# Patient Record
Sex: MALE | Race: WHITE | NOT HISPANIC OR LATINO | Employment: STUDENT | URBAN - METROPOLITAN AREA
[De-identification: names, ages, dates, MRNs, and addresses within clinical notes are randomized per-mention and may not be internally consistent; named-entity substitution may affect disease eponyms.]

---
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
## 2024-07-22 ENCOUNTER — OFFICE VISIT (OUTPATIENT)
Dept: FAMILY MEDICINE CLINIC | Facility: CLINIC | Age: 19
End: 2024-07-22
Payer: COMMERCIAL

## 2024-07-22 VITALS
SYSTOLIC BLOOD PRESSURE: 126 MMHG | HEIGHT: 69 IN | WEIGHT: 167.8 LBS | BODY MASS INDEX: 24.85 KG/M2 | HEART RATE: 80 BPM | RESPIRATION RATE: 16 BRPM | TEMPERATURE: 97.2 F | DIASTOLIC BLOOD PRESSURE: 80 MMHG | OXYGEN SATURATION: 98 %

## 2024-07-22 DIAGNOSIS — Z23 ENCOUNTER FOR IMMUNIZATION: ICD-10-CM

## 2024-07-22 DIAGNOSIS — Z00.00 ANNUAL PHYSICAL EXAM: Primary | ICD-10-CM

## 2024-07-22 PROCEDURE — 99395 PREV VISIT EST AGE 18-39: CPT | Performed by: FAMILY MEDICINE

## 2024-07-22 PROCEDURE — 90621 MENB-FHBP VACC 2/3 DOSE IM: CPT

## 2024-07-22 PROCEDURE — 90471 IMMUNIZATION ADMIN: CPT

## 2024-07-22 NOTE — PATIENT INSTRUCTIONS
"Patient Education     Routine physical for adults   The Basics   Written by the doctors and editors at [facility]   What is a physical? -- A physical is a routine visit, or \"check-up,\" with your doctor. You might also hear it called a \"wellness visit\" or \"preventive visit.\"  During each visit, the doctor will:   Ask about your physical and mental health   Ask about your habits, behaviors, and lifestyle   Do an exam   Give you vaccines if needed   Talk to you about any medicines you take   Give advice about your health   Answer your questions  Getting regular check-ups is an important part of taking care of your health. It can help your doctor find and treat any problems you have. But it's also important for preventing health problems.  A routine physical is different from a \"sick visit.\" A sick visit is when you see a doctor because of a health concern or problem. Since physicals are scheduled ahead of time, you can think about what you want to ask the doctor.  How often should I get a physical? -- It depends on your age and health. In general, for people age 21 years and older:   If you are younger than 50 years, you might be able to get a physical every 3 years.   If you are 50 years or older, your doctor might recommend a physical every year.  If you have an ongoing health condition, like diabetes or high blood pressure, your doctor will probably want to see you more often.  What happens during a physical? -- In general, each visit will include:   Physical exam - The doctor or nurse will check your height, weight, heart rate, and blood pressure. They will also look at your eyes and ears. They will ask about how you are feeling and whether you have any symptoms that bother you.   Medicines - It's a good idea to bring a list of all the medicines you take to each doctor visit. Your doctor will talk to you about your medicines and answer any questions. Tell them if you are having any side effects that bother you. You " "should also tell them if you are having trouble paying for any of your medicines.   Habits and behaviors - This includes:   Your diet   Your exercise habits   Whether you smoke, drink alcohol, or use drugs   Whether you are sexually active   Whether you feel safe at home  Your doctor will talk to you about things you can do to improve your health and lower your risk of health problems. They will also offer help and support. For example, if you want to quit smoking, they can give you advice and might prescribe medicines. If you want to improve your diet or get more physical activity, they can help you with this, too.   Lab tests, if needed - The tests you get will depend on your age and situation. For example, your doctor might want to check your:   Cholesterol   Blood sugar   Iron level   Vaccines - The recommended vaccines will depend on your age, health, and what vaccines you already had. Vaccines are very important because they can prevent certain serious or deadly infections.   Discussion of screening - \"Screening\" means checking for diseases or other health problems before they cause symptoms. Your doctor can recommend screening based on your age, risk, and preferences. This might include tests to check for:   Cancer, such as breast, prostate, cervical, ovarian, colorectal, prostate, lung, or skin cancer   Sexually transmitted infections, such as chlamydia and gonorrhea   Mental health conditions like depression and anxiety  Your doctor will talk to you about the different types of screening tests. They can help you decide which screenings to have. They can also explain what the results might mean.   Answering questions - The physical is a good time to ask the doctor or nurse questions about your health. If needed, they can refer you to other doctors or specialists, too.  Adults older than 65 years often need other care, too. As you get older, your doctor will talk to you about:   How to prevent falling at " home   Hearing or vision tests   Memory testing   How to take your medicines safely   Making sure that you have the help and support you need at home  All topics are updated as new evidence becomes available and our peer review process is complete.  This topic retrieved from [employer] on: May 02, 2024.  Topic 108130 Version 1.0  Release: 32.4.3 - C32.122  © 2024 UpToDate, Inc. and/or its affiliates. All rights reserved.  Consumer Information Use and Disclaimer   Disclaimer: This generalized information is a limited summary of diagnosis, treatment, and/or medication information. It is not meant to be comprehensive and should be used as a tool to help the user understand and/or assess potential diagnostic and treatment options. It does NOT include all information about conditions, treatments, medications, side effects, or risks that may apply to a specific patient. It is not intended to be medical advice or a substitute for the medical advice, diagnosis, or treatment of a health care provider based on the health care provider's examination and assessment of a patient's specific and unique circumstances. Patients must speak with a health care provider for complete information about their health, medical questions, and treatment options, including any risks or benefits regarding use of medications. This information does not endorse any treatments or medications as safe, effective, or approved for treating a specific patient. UpToDate, Inc. and its affiliates disclaim any warranty or liability relating to this information or the use thereof.The use of this information is governed by the Terms of Use, available at https://www.[employer] [employer].com/en/know/clinical-effectiveness-terms. 2024© UpToDate, Inc. and its affiliates and/or licensors. All rights reserved.  Copyright   © 2024 UpToDate, Inc. and/or its affiliates. All rights reserved.

## 2024-07-22 NOTE — PROGRESS NOTES
"Adult Annual Physical  Name: Dav Glasgow      : 2005      MRN: [id number]  Encounter Provider: Wanda Panda DO  Encounter Date: 2024   Encounter department: [facility]    Assessment & Plan   1. Annual physical exam  2. Encounter for immunization  -     MENINGOCOCCAL B RECOMBINANT    Immunizations and preventive care screenings were discussed with patient today. Appropriate education was printed on patient's after visit summary.    Counseling:  Dental Health: discussed importance of regular tooth brushing, flossing, and dental visits.      Depression Screening and Follow-up Plan: Patient was screened for depression during today's encounter. They screened negative with a PHQ-2 score of 2.        History of Present Illness     Adult Annual Physical:  Patient presents for annual physical. Here for college physcial  Transferring into [facility].     Diet and Physical Activity:  - Diet/Nutrition: well balanced diet.  - Exercise: moderate cardiovascular exercise. tennis basketball    Depression Screening:  - PHQ-2 Score: 2    General Health:  - Sleep: sleeps well.  - Hearing: normal hearing right ear and normal hearing left ear.  - Vision: no vision problems.  - Dental: regular dental visits.     Health:    - Urinary symptoms: none.     Review of Systems   Constitutional: Negative.    HENT: Negative.     Eyes: Negative.    Respiratory: Negative.     Cardiovascular: Negative.    Gastrointestinal: Negative.    Endocrine: Negative.    Genitourinary: Negative.    Musculoskeletal: Negative.    Skin: Negative.    Allergic/Immunologic: Negative.    Neurological: Negative.    Hematological: Negative.    Psychiatric/Behavioral: Negative.       Medical History Reviewed by provider this encounter:         Objective     /80 (BP Location: Left arm, Patient Position: Sitting, Cuff Size: Standard)   Pulse 80   Temp (!) 97.2 °F (36.2 °C) (Tympanic)   Resp 16   Ht 5' 8.62\" (1.743 m)   Wt 76.1 " kg (167 lb 12.8 oz)   SpO2 98%   BMI 25.05 kg/m²     Physical Exam  Vitals and nursing note reviewed.   Constitutional:       Appearance: Normal appearance. He is well-developed.   HENT:      Head: Normocephalic and atraumatic.      Right Ear: External ear normal.      Left Ear: External ear normal.      Nose: Nose normal.   Eyes:      Extraocular Movements: Extraocular movements intact.      Conjunctiva/sclera: Conjunctivae normal.      Pupils: Pupils are equal, round, and reactive to light.   Cardiovascular:      Rate and Rhythm: Normal rate and regular rhythm.      Heart sounds: Normal heart sounds.   Pulmonary:      Effort: Pulmonary effort is normal.      Breath sounds: Normal breath sounds.   Abdominal:      General: Bowel sounds are normal.      Palpations: Abdomen is soft.   Musculoskeletal:         General: Normal range of motion.      Cervical back: Normal range of motion and neck supple.   Skin:     General: Skin is warm and dry.      Capillary Refill: Capillary refill takes less than 2 seconds.   Neurological:      General: No focal deficit present.      Mental Status: He is alert and oriented to person, place, and time.   Psychiatric:         Mood and Affect: Mood normal.         Behavior: Behavior normal.         Thought Content: Thought content normal.         Judgment: Judgment normal.

## 2024-07-29 ENCOUNTER — CLINICAL SUPPORT (OUTPATIENT)
Dept: FAMILY MEDICINE CLINIC | Facility: CLINIC | Age: 19
End: 2024-07-29
Payer: COMMERCIAL

## 2024-07-29 DIAGNOSIS — Z11.1 SCREENING FOR TUBERCULOSIS: Primary | ICD-10-CM

## 2024-07-29 PROCEDURE — 86580 TB INTRADERMAL TEST: CPT

## 2024-07-29 NOTE — PROGRESS NOTES
Assessment/Plan:      Diagnoses and all orders for this visit:    Screening for tuberculosis  -     TB Skin Test          Subjective:     Patient ID: Dav Glasgow is a 19 y.o. male.          Objective:      There were no vitals taken for this visit.

## 2024-07-31 ENCOUNTER — CLINICAL SUPPORT (OUTPATIENT)
Dept: FAMILY MEDICINE CLINIC | Facility: CLINIC | Age: 19
End: 2024-07-31

## 2024-07-31 DIAGNOSIS — Z11.1 ENCOUNTER FOR PPD SKIN TEST READING: Primary | ICD-10-CM

## 2024-07-31 LAB
INDURATION: 0 MM
TB SKIN TEST: NEGATIVE

## 2024-07-31 NOTE — PROGRESS NOTES
Assessment/Plan:      Diagnoses and all orders for this visit:    Encounter for PPD skin test reading          Subjective:     Patient ID: Dav Glasgow is a 19 y.o. male.          Objective:    Left forearm Negative PPD reading.  There were no vitals taken for this visit.

## 2025-01-24 ENCOUNTER — CLINICAL SUPPORT (OUTPATIENT)
Dept: FAMILY MEDICINE CLINIC | Facility: CLINIC | Age: 20
End: 2025-01-24
Payer: COMMERCIAL

## 2025-01-24 DIAGNOSIS — Z23 ENCOUNTER FOR IMMUNIZATION: Primary | ICD-10-CM

## 2025-01-24 PROCEDURE — 90471 IMMUNIZATION ADMIN: CPT

## 2025-01-24 PROCEDURE — 90621 MENB-FHBP VACC 2/3 DOSE IM: CPT

## 2025-01-24 NOTE — PROGRESS NOTES
Assessment/Plan:      Diagnoses and all orders for this visit:    Encounter for immunization          Subjective:     Patient ID: Dav Glasgow is a 19 y.o. male.          Objective:      There were no vitals taken for this visit.

## 2025-03-17 ENCOUNTER — OFFICE VISIT (OUTPATIENT)
Dept: DERMATOLOGY | Facility: CLINIC | Age: 20
End: 2025-03-17
Payer: COMMERCIAL

## 2025-03-17 ENCOUNTER — TELEPHONE (OUTPATIENT)
Dept: DERMATOLOGY | Facility: CLINIC | Age: 20
End: 2025-03-17

## 2025-03-17 DIAGNOSIS — L20.9 ATOPIC DERMATITIS, UNSPECIFIED TYPE: Primary | ICD-10-CM

## 2025-03-17 PROCEDURE — 99214 OFFICE O/P EST MOD 30 MIN: CPT

## 2025-03-17 RX ORDER — TACROLIMUS 1 MG/G
OINTMENT TOPICAL
Qty: 100 G | Refills: 3 | Status: SHIPPED | OUTPATIENT
Start: 2025-03-17

## 2025-03-17 RX ORDER — DUPILUMAB 300 MG/2ML
INJECTION, SOLUTION SUBCUTANEOUS
Qty: 4 ML | Refills: 0 | Status: SHIPPED | OUTPATIENT
Start: 2025-03-17

## 2025-03-17 RX ORDER — CLOBETASOL PROPIONATE 0.5 MG/G
OINTMENT TOPICAL
Qty: 60 G | Refills: 2 | Status: SHIPPED | OUTPATIENT
Start: 2025-03-17

## 2025-03-17 RX ORDER — DUPILUMAB 300 MG/2ML
INJECTION, SOLUTION SUBCUTANEOUS
Qty: 2 ML | Refills: 11 | Status: SHIPPED | OUTPATIENT
Start: 2025-03-17

## 2025-03-17 RX ORDER — HYDROCORTISONE 25 MG/G
OINTMENT TOPICAL
Qty: 453.6 G | Refills: 0 | Status: SHIPPED | OUTPATIENT
Start: 2025-03-17

## 2025-03-17 NOTE — TELEPHONE ENCOUNTER
PA for Dupixent 300mg pen SUBMITTED to [facility]     via    []CMM-KEY:   [x]Surescripts-Case ID # [id number]   []Availity-Auth ID # NDC #   []Faxed to plan   []Other website   []Phone call Case ID #     [x]PA sent as URGENT    All office notes, labs and other pertaining documents and studies sent. Clinical questions answered. Awaiting determination from insurance company.     Turnaround time for your insurance to make a decision on your Prior Authorization can take 7-21 business days.

## 2025-03-17 NOTE — PATIENT INSTRUCTIONS
"ATOPIC DERMATITIS (\"ECZEMA\")       TODAY'S PLAN:     PRESCRIPTION MANAGEMENT:  We discussed that treatment often begins with topical steroids and topical calcineurin inhibitors; topical DESHAWN-inhibitors are emerging as potentially useful.  Systemic therapy with oral corticosteroids such as prednisone or DESHAWN-inhibitors or Dupixent (dupilumab) may also be indicated.  Side effects of these medications were discussed.    Skin Hygiene:      Recommend using only mild cleansers (hypoallergenic and without fragrances) and fragrance free detergent (not \"unscented\" products which contain a masking agent); we discussed avoiding irritants/fragranced products.  Encourage regular use of a humidifier to increase humidity and help prevent water loss.  At least 3 times day whole-body application using a good moisturizer such as Eucerin or CeraVe CREAM.      Topical Management:      Hydrocortisone 2.5% ointment TO THE FACE for up to week. Do not apply to underarms or genitals unless directed.    When starting the protopic, start by applying just hydrocortisone for 2-3 days, then add the protopic and gradually move to just applying the protopic.     Clobetasol 0.05% ointment FLARE TREATMENT:  Apply a thin layer TWICE A DAY to affected areas of skin for no more than 2 weeks straight. Do not apply to face, underarms or genitals unless directed.    Protopic (tacrolimus) PROTOPIC (tacrolimus) 0.1% ointment (approved for ages 16 years and older). MAINTENANCE TREATMENT.  Apply a thin layer TWICE A DAY on \"Mondays through Fridays ONLY\" (do not apply on weekends). Wash hands before and after using this product.      Intensive Therapy:      NONE      Systemic Strategies:      DUPIXENT  (Dupilumab)  ADULT PATIENT.  Dosage is NOT weight-based.  LOADING DOSE and MAINTENANCE DOSE are required.  ADULT LOADING DOSE:  600 mg.  Select Epic ORDER:  \"Dupixent 300mg/2mL SC SOPN\"  Select \"FREE TEXT\".  Dispense:  4 mL.  Refill:  0.  SIG:  LOADING DOSE ONLY:  " "Give  mg injections (total of 600 mg) as instructed.  ADULT MAINTENANCE DOSE:  300 mg.  Select Epic ORDER:  \"Dupixent 300mg/2mL SC SOPN\"  Select \"FREE TEXT\".  Dispense:  2 mL.  Refill:  25.  SIG:  MAINTENANCE DOSE:  Give one 300 mg injection EVERY 2 WEEKS as instructed.  Side effects and warnings discussed.  Reviewed how to clean injections sites properly and how to change location of injection sites regularly.  Self-injecting pen (identified as \"SOPN\" in Epic) is preferred.  Patient should be seen by prescribing dermatologist at least every 6 months for follow-up.      Investigations: NONE      MEDICAL DECISION MAKING  Treatment Goal:  Resolution of the CHRONIC condition.       Chronic condition is NOT at treatment goal.  It is progressing along its expected course OR is poorly-controlled.          "

## 2025-03-17 NOTE — TELEPHONE ENCOUNTER
Hello prior authorization team,   Can we please initiate a prior authorization for this patient for Dupixent.     Loading dose: 600mg subcutaneously on week 0.   Maintenance dose: 300mg subcutaneously on week 2, and every two weeks thereafter.     Thanks,   María Thrasher PA-C

## 2025-03-17 NOTE — PROGRESS NOTES
"[facility] Note     Patient Name: Dav Glasgow  Encounter Date: 3/17/25     Have you been cared for by a [facility] Dermatologist in the last 3 years and, if so, which description applies to you?    Yes.  I have been here within the last 3 years, and my medical history has NOT changed since that time.  I am MALE/not capable of bearing children.    REVIEW OF SYSTEMS:  Have you recently had or currently have any of the following? No changes in my recent health.   PAST MEDICAL HISTORY:  Have you personally ever had or currently have any of the following?  If \"YES,\" then please provide more detail. No changes in my medical history.   HISTORY OF IMMUNOSUPPRESSION: Do you have a history of any of the following:  Systemic Immunosuppression such as Diabetes, Biologic or Immunotherapy, Chemotherapy, Organ Transplantation, Bone Marrow Transplantation or Prednisone?  No     Answering \"YES\" requires the addition of the dotphrase \"IMMUNOSUPPRESSED\" as the first diagnosis of the patient's visit.   FAMILY HISTORY:  Any \"first degree relatives\" (parent, brother, sister, or child) with the following?    No changes in my family's known health.   PATIENT EXPERIENCE:    Do you want the Dermatologist to perform a COMPLETE skin exam today including a clinical examination under the \"bra and underwear\" areas?  NO  If necessary, do we have your permission to call and leave a detailed message on your Preferred Phone number that includes your specific medical information?  Yes      No Known Allergies   Current Outpatient Medications:     clobetasol (TEMOVATE) 0.05 % ointment, Apply topically 2 (two) times a day, Disp: 60 g, Rfl: 1          Whom besides the patient is providing clinical information about today's encounter?   NO ADDITIONAL HISTORIAN (patient alone provided history)    Physical Exam and Assessment/Plan by Diagnosis:      ATOPIC DERMATITIS (\"ECZEMA\")    Physical Exam:  Anatomic Location: face-forehead, upper " "eyelids, corners of mouth; neck, arms - antecubital fossa, upper arms, and dorsal wrists; bilateral buttock, and bilateral popliteal fossa  Morphologic Description:  Eczematous papules/plaques with overlying excoriations   Body Surface Area at Today's Visit (patient's own palm = ~1% BSA): 15%  Global Assessment of Severity:  SEVERE:  Marked erythema (deep or bright red), marked induration/papulation, and/or marked lichenification.  Oozing or crusting may be present.  Suspected SUPERINFECTION (erythema, oozing, and/or crusting is present)?: No    Additional History of Present Condition:  Patient reports having eczema for the last 2-3 years. He reports he had asthma when he was younger and has season allergies. He denies any family history of eczema. He has tried over the counter moisturizers, lukewarm showers/baths, oral prednisone, and topicals such as hydrocortisone for the face, clobetasol ointment, and Efudex 5% cream (prescribed by provider on [facility]) without improvement. He is not using anything topically. Patient states he is very itchy. He reports his eczema is getting worse and spreading.        TODAY'S PLAN:         Skin Hygiene:      Recommend using only mild cleansers (hypoallergenic and without fragrances) and fragrance free detergent (not \"unscented\" products which contain a masking agent); we discussed avoiding irritants/fragranced products.  At least 3 times day whole-body application using a good moisturizer such as Eucerin, CeraVe, Cetaphil, or Aveeno cream.   Recommend lukewarm showers/baths.   Recommend use of Dove for sensitive skin bar soap.       Topical Management:      Hydrocortisone 2.5% ointment- can apply to affected areas of face such as forehead, eyelids, and corners of mouth twice a day for 7 days only. Take a 1 week break and use as needed for flares.   When starting the Protopic  (tacrolimus), this can burn/sting on the face when you first start using this. Can use " "hydrocortisone on its own for 3 days, then combine with Protopic and apply for the remaining 4 days, transitioning to just Protopic on its own.   Clobetasol 0.05% ointment FLARE TREATMENT:  Apply a thin layer TWICE A DAY to affected areas of skin for no more than 2 weeks straight. Take a 1 week break, then use as needed for flares. Do not apply to face, underarms or genitals unless directed.  Protopic (tacrolimus) PROTOPIC (tacrolimus) 0.1% ointment (approved for ages 16 years and older). MAINTENANCE TREATMENT.  Apply a thin layer TWICE A DAY on \"Mondays through Fridays ONLY\" (do not apply on weekends). Wash hands before and after using this product.                Systemic Strategies:      DUPIXENT  (Dupilumab)  ADULT PATIENT.  Dosage is NOT weight-based.  LOADING DOSE and MAINTENANCE DOSE are required.  ADULT LOADING DOSE:  600 mg.  Select Epic ORDER:  \"Dupixent 300mg/2mL SC SOPN\"  Select \"FREE TEXT\".  Dispense:  4 mL.  Refill:  0.  SIG:  LOADING DOSE ONLY:  Give  mg injections (total of 600 mg) as instructed.  ADULT MAINTENANCE DOSE:  300 mg.  Select Epic ORDER:  \"Dupixent 300mg/2mL SC SOPN\"  Select \"FREE TEXT\".  Dispense:  2 mL.  Refill:  25.  SIG:  MAINTENANCE DOSE:  Give one 300 mg injection EVERY 2 WEEKS as instructed.  Side effects and warnings discussed.  Reviewed how to clean injections sites properly and how to change location of injection sites regularly.  Self-injecting pen (identified as \"SOPN\" in Epic) is preferred.  Patient should be seen by prescribing dermatologist at least every 6 months for follow-up.             MEDICAL DECISION MAKING  Treatment Goal:  Resolution of the CHRONIC condition.       Chronic condition is NOT at treatment goal.  It is progressing along its expected course OR is poorly-controlled.          Scribe Attestation      I,:  Tanner Adams am acting as a scribe while in the presence of the attending physician.:       I,:  María Thrasher PA-C personally performed " the services described in this documentation    as scribed in my presence.:

## 2025-03-18 NOTE — TELEPHONE ENCOUNTER
PA for Dupixent 300mg pen  APPROVED     Date(s) approved until 07/18/2025    Case #[id number]     Patient advised by          []MyChart Message  []Phone call   [x]LMOM  []L/M to call office as no active Communication consent on file  []Unable to leave detailed message as VM not approved on Communication consent       Pharmacy advised by    [x]Fax  []Phone call  []Secure Chat    Specialty Pharmacy    [x][facility]      Approval letter scanned into Media Yes

## 2025-03-18 NOTE — TELEPHONE ENCOUNTER
The patient returning a call with the notification of the approval. He will follow up with the pharmacy for a delivery date and contact us to schedule for a nurse education visit once he has a date.

## 2025-04-01 DIAGNOSIS — L20.9 ATOPIC DERMATITIS, UNSPECIFIED TYPE: ICD-10-CM

## 2025-04-01 RX ORDER — DUPILUMAB 300 MG/2ML
INJECTION, SOLUTION SUBCUTANEOUS
Qty: 2 ML | Refills: 11 | Status: SHIPPED | OUTPATIENT
Start: 2025-04-01

## 2025-04-01 RX ORDER — DUPILUMAB 300 MG/2ML
INJECTION, SOLUTION SUBCUTANEOUS
Qty: 4 ML | Refills: 0 | Status: SHIPPED | OUTPATIENT
Start: 2025-04-01

## 2025-04-15 DIAGNOSIS — L20.9 ATOPIC DERMATITIS, UNSPECIFIED TYPE: ICD-10-CM

## 2025-04-15 RX ORDER — DUPILUMAB 300 MG/2ML
INJECTION, SOLUTION SUBCUTANEOUS
Qty: 2 ML | Refills: 11 | Status: SHIPPED | OUTPATIENT
Start: 2025-04-15

## 2025-04-15 RX ORDER — DUPILUMAB 300 MG/2ML
INJECTION, SOLUTION SUBCUTANEOUS
Qty: 4 ML | Refills: 0 | Status: SHIPPED | OUTPATIENT
Start: 2025-04-15

## 2025-04-25 ENCOUNTER — CLINICAL SUPPORT (OUTPATIENT)
Dept: DERMATOLOGY | Facility: CLINIC | Age: 20
End: 2025-04-25
Payer: COMMERCIAL

## 2025-04-25 VITALS
TEMPERATURE: 99 F | HEART RATE: 98 BPM | DIASTOLIC BLOOD PRESSURE: 74 MMHG | SYSTOLIC BLOOD PRESSURE: 122 MMHG | OXYGEN SATURATION: 98 %

## 2025-04-25 DIAGNOSIS — Z76.89 ENCOUNTER FOR MEDICATION ADMINISTRATION: ICD-10-CM

## 2025-04-25 DIAGNOSIS — L20.9 ATOPIC DERMATITIS, UNSPECIFIED TYPE: Primary | ICD-10-CM

## 2025-04-25 PROCEDURE — NC001 PR NO CHARGE

## 2025-04-25 PROCEDURE — 96372 THER/PROPH/DIAG INJ SC/IM: CPT | Performed by: DERMATOLOGY

## 2025-04-25 NOTE — PROGRESS NOTES
DUPIXENT Biologic Injectable Administration     Additional History of Present Condition:  Patient is present for education and administration of Dupixent. Medication administration order placed . Provider order matches prescription order.     Assessment and Plan:  Based on a thorough discussion of this condition and the management approach to it (including a comprehensive discussion of the known risks, side effects and potential benefits of treatment), the patient (family) agrees to implement the following specific plan:  First Dupixent injection administered today in the right and left thigh   Verbal consent obtained to administer.   Next dose due 05/09/2025  Patient provided education and training to continue to administer this at home.   Side effects discussed and how to report  Schedule 6 month follow up with ordering provider. Follow up scheduled with María in October       Biologic Injectable Administration Note  Diagnosis: Atopic dermatitis   This is injection number 1    Informed consent: Discussed risks (Risks of hypersensitivity reaction, injection site reaction, conjunctivitis/keratitis, HSV reactivation, increased susceptibility to parasitic infections, inefficacy were reviewed.) Verbal consent obtained.   Preparation: After discussion potential procedure related risks including pain, bleeding, new infection, reactivation of latent infection, inefficacy, increased risk of malignancy, hypersensitivity reaction, injection site reaction, verbal consent was obtained. The areas were cleansed with alcohol prep pads and allowed to fully air dry for 3 minutes.  Procedure Details:  600mg was injected subcutaneously in the right and left thigh   Lot Number: [id number]  Expiration: 11/30/2026  Total Injected: 600mg  NDC: 8580-8233-35     Patient tolerated procedure well, with minimal pinpoint bleeding that was controlled with pressure. Aftercare was reviewed.       What is DUPIXENT? DUPIXENT is a prescription medicine  used: „ to treat adults and children 6 months of age and older with moderate-to-severe eczema (atopic dermatitis or AD) that is not well controlled with prescription therapies used on the skin (topical), or who cannot use topical therapies. DUPIXENT can be used with or without topical corticosteroids. „ with other asthma medicines for the maintenance treatment of moderate-to-severe asthma in adults and children 6 years of age and older whose asthma is not controlled with their current asthma medicines. DUPIXENT helps prevent severe asthma attacks (exacerbations) and can improve your breathing. DUPIXENT may also help reduce the amount of oral corticosteroids you need while preventing severe asthma attacks and improving your breathing. DUPIXENT is not used to treat sudden breathing problems. „ with other medicines for the maintenance treatment of chronic rhinosinusitis with nasal polyposis (CRSwNP) in adults whose disease is not controlled. „ to treat adults and children 12 years of age and older, who weigh at least 88 pounds (40 kg), with eosinophilic esophagitis (EoE). „ to treat adults with prurigo nodularis (PN).  DUPIXENT works by blocking two proteins that contribute to a type of inflammation that plays a major role in atopic dermatitis, asthma, chronic rhinosinusitis with nasal polyposis, eosinophilic esophagitis, and prurigo nodularis.  It is not known if DUPIXENT is safe and effective in children with atopic dermatitis under 6 months of age.  It is not known if DUPIXENT is safe and effective in children with asthma under 6 years of age.  It is not known if DUPIXENT is safe and effective in children with chronic rhinosinusitis with nasal polyposis under 18 years of age.  It is not known if DUPIXENT is safe and effective in children with eosinophilic esophagitis under 12 years of age and who weigh at least 88 pounds (40 kg).  It is not known if DUPIXENT is safe and effective in children with prurigo nodularis  under 18 years of age      Before using DUPIXENT, tell your healthcare provider about all your medical conditions, including if you:  have eye problems.  have a parasitic (helminth) infection.  are scheduled to receive any vaccinations. You should not receive a ?live vaccine? right before and during treatment with DUPIXENT.  are pregnant or plan to become pregnant. It is not known whether DUPIXENT will harm your unborn baby. Pregnancy Exposure Registry. There is a pregnancy exposure registry for women who use DUPIXENT during pregnancy. The purpose of this registry is to collect information about the health of you and your baby. Your healthcare provider can enroll you in this registry. You may also enroll yourself or get more information about the registry by calling 1-363.290.5410 or going to [email or web address]  are breastfeeding or plan to breastfeed. It is not known whether DUPIXENT passes into your breast milk. Tell your healthcare provider about all of the medicines you take, including prescription and over-the-counter medicines, vitamins, and herbal supplements. Especially tell your healthcare provider if you:  are taking oral, topical, or inhaled corticosteroid medicines  have asthma and use an asthma medicine  have atopic dermatitis, chronic rhinosinusitis with nasal polyposis, eosinophilic esophagitis, or prurigo nodularis and also have asthma Do not change or stop your corticosteroid medicine or other asthma medicine without talking to your healthcare provider. This may cause other symptoms that were controlled by the corticosteroid medicine or other asthma medicine to come back.    What are the possible side effects of DUPIXENT? DUPIXENT can cause serious side effects, including:  Allergic reactions. DUPIXENT can cause allergic reactions that can sometimes be severe. Stop using DUPIXENT and tell your healthcare provider or get emergency help right away if you get any of the  following signs or symptoms: „ breathing problems or wheezing „ fast pulse „ fever „ general ill feeling „ swollen lymph nodes „ swelling of the face, lips, mouth, tongue, or throat „ hives „ itching „ nausea or vomiting „ fainting, dizziness, feeling lightheaded „ joint pain „ skin rash „ cramps in your stomach-area  Eye problems. Tell your healthcare provider if you have any new or worsening eye problems, including eye pain or changes in vision, such as blurred vision. Your healthcare provider may send you to an ophthalmologist for an eye exam if needed.  Inflammation of your blood vessels. Rarely, this can happen in people with asthma who receive DUPIXENT. This may happen in people who also take a steroid medicine by mouth that is being stopped or the dose is being lowered. It is not known whether this is caused by DUPIXENT. Tell your healthcare provider right away if you have: „ rash „ worsening shortness of breath „ persistent fever „ chest pain „ a feeling of pins and needles or numbness of your arms or legs  Joint aches and pain. Joint aches and pain can happen in people who use DUPIXENT. Some people have had trouble walking or moving due to their joint symptoms, and in some cases needed to be hospitalized. Tell your healthcare provider about any new or worsening joint symptoms. Your healthcare provider may stop DUPIXENT if you develop joint symptoms. The most common side effects of DUPIXENT include:  injection site reactions  upper respiratory tract infections  eye and eyelid inflammation, including redness, swelling, and itching, sometimes with blurred vision  herpes virus infections  common cold symptoms (nasopharyngitis)  cold sores in your mouth or on your lips  high count of a certain white blood cell (eosinophilia)  dizziness  muscle pain  diarrhea  pain in the throat (oropharyngeal pain)  gastritis  joint pain (arthralgia)  trouble sleeping (insomnia)  toothache  parasitic (helminth) infections The  following additional side effects have been reported with DUPIXENT:  facial rash or redness Tell your healthcare provider if you have any side effect that bothers you or that does not go away. These are not all of the possible side effects of DUPIXENT. Call your doctor for medical advice about side effects. You may report side effects to FDA at 2-993-WWV-5437

## 2025-05-02 DIAGNOSIS — L20.9 ATOPIC DERMATITIS, UNSPECIFIED TYPE: ICD-10-CM

## 2025-05-02 RX ORDER — DUPILUMAB 300 MG/2ML
INJECTION, SOLUTION SUBCUTANEOUS
Qty: 4 ML | Refills: 11 | Status: SHIPPED | OUTPATIENT
Start: 2025-05-02

## 2025-05-13 ENCOUNTER — OCCMED (OUTPATIENT)
Age: 20
End: 2025-05-13

## 2025-05-13 ENCOUNTER — APPOINTMENT (OUTPATIENT)
Dept: RADIOLOGY | Age: 20
End: 2025-05-13

## 2025-05-13 DIAGNOSIS — Z02.1 NEED FOR HISTORY AND PHYSICAL EXAMINATION FOR EMPLOYMENT: ICD-10-CM

## 2025-05-13 DIAGNOSIS — Z00.00 ROUTINE GENERAL MEDICAL EXAMINATION AT A HEALTH CARE FACILITY: Primary | ICD-10-CM

## 2025-05-13 PROCEDURE — 84202 ASSAY RBC PROTOPORPHYRIN: CPT | Performed by: EMERGENCY MEDICINE

## 2025-05-13 PROCEDURE — 86870 RBC ANTIBODY IDENTIFICATION: CPT | Performed by: EMERGENCY MEDICINE

## 2025-05-13 PROCEDURE — 71045 X-RAY EXAM CHEST 1 VIEW: CPT

## 2025-05-16 LAB
LEAD BLD-MCNC: <1 UG/DL (ref 0–3.4)
ZPP RBC-MCNC: 21 UG/DL (ref 0–99)

## 2025-06-23 ENCOUNTER — TELEPHONE (OUTPATIENT)
Age: 20
End: 2025-06-23

## 2025-06-23 DIAGNOSIS — L20.9 ATOPIC DERMATITIS, UNSPECIFIED TYPE: ICD-10-CM

## 2025-06-23 NOTE — TELEPHONE ENCOUNTER
PA for Dupilumab (Dupixent) 300 MG/2ML SOAJ SUBMITTED to     via    []CMM-KEY:   []Surescripts-Case ID #   []Availity-Auth ID # NDC #   [x]Faxed to plan   []Other website   []Phone call Case ID #     [x]PA sent as URGENT    All office notes, labs and other pertaining documents and studies sent. Clinical questions answered. Awaiting determination from insurance company.     Turnaround time for your insurance to make a decision on your Prior Authorization can take 7-21 business days.

## 2025-06-23 NOTE — TELEPHONE ENCOUNTER
PA for Dupixent 300MG/2ML APPROVED     Date(s) approved 6/23/25-6/23/26    Case #    Patient advised by          []MyChart Message  []Phone call   [x]LMOM  []L/M to call office as no active Communication consent on file  []Unable to leave detailed message as VM not approved on Communication consent       Pharmacy advised by    [x]Fax  []Phone call  []Secure Chat    Specialty Pharmacy    [x][facility]      Approval letter scanned into Media Yes

## 2025-06-27 NOTE — TELEPHONE ENCOUNTER
Please send patient's medication to [facility] specialty pharmacy.    Thank you

## 2025-06-30 RX ORDER — DUPILUMAB 300 MG/2ML
INJECTION, SOLUTION SUBCUTANEOUS
Qty: 4 ML | Refills: 11 | Status: SHIPPED | OUTPATIENT
Start: 2025-06-30

## 2025-07-21 DIAGNOSIS — L20.9 ATOPIC DERMATITIS, UNSPECIFIED TYPE: ICD-10-CM

## 2025-07-22 RX ORDER — HYDROCORTISONE 25 MG/G
OINTMENT TOPICAL
Qty: 20 G | Refills: 2 | Status: SHIPPED | OUTPATIENT
Start: 2025-07-22

## 2025-07-22 NOTE — TELEPHONE ENCOUNTER
Pt was seen 03/17/25    Hydrocortisone 2.5% ointment- can apply to affected areas of face such as forehead, eyelids, and corners of mouth twice a day for 7 days only.

## 2025-08-19 ENCOUNTER — TELEPHONE (OUTPATIENT)
Dept: DERMATOLOGY | Facility: CLINIC | Age: 20
End: 2025-08-19